# Patient Record
Sex: MALE | Race: WHITE | NOT HISPANIC OR LATINO | Employment: UNEMPLOYED | URBAN - METROPOLITAN AREA
[De-identification: names, ages, dates, MRNs, and addresses within clinical notes are randomized per-mention and may not be internally consistent; named-entity substitution may affect disease eponyms.]

---

## 2019-06-24 ENCOUNTER — OFFICE VISIT (OUTPATIENT)
Dept: FAMILY MEDICINE CLINIC | Facility: CLINIC | Age: 29
End: 2019-06-24

## 2019-06-24 VITALS
BODY MASS INDEX: 30.66 KG/M2 | SYSTOLIC BLOOD PRESSURE: 125 MMHG | TEMPERATURE: 98.1 F | DIASTOLIC BLOOD PRESSURE: 77 MMHG | HEART RATE: 70 BPM | WEIGHT: 219 LBS | HEIGHT: 71 IN | RESPIRATION RATE: 16 BRPM | OXYGEN SATURATION: 99 %

## 2019-06-24 DIAGNOSIS — R19.06 EPIGASTRIC MASS: Primary | ICD-10-CM

## 2019-06-24 PROCEDURE — 99213 OFFICE O/P EST LOW 20 MIN: CPT | Performed by: PREVENTIVE MEDICINE

## 2019-06-24 NOTE — PATIENT INSTRUCTIONS
If nausea, vomitng, diarrhea constipation.  Painful mass-to ER.  Patient to set up appointment with General surgeon.  No restrictions presently

## 2019-06-24 NOTE — PROGRESS NOTES
"Subjective   Ed Nam is a 28 y.o. male presents for   Chief Complaint   Patient presents with   • Mass     Abdominal x 2 months   No work or home injury-just noted swelling epigastric area 6 weeks ago and seems to be getting bigger. No nausea, fever, vomiting diarrhea orconstipation.    History of Present Illness     Vitals:    06/24/19 1520 06/24/19 1521   BP: 120/75 125/77   BP Location: Right arm Left arm   Patient Position: Sitting Sitting   Cuff Size: Large Adult Large Adult   Pulse: 70    Resp: 16    Temp: 98.1 °F (36.7 °C)    TempSrc: Oral    SpO2: 99%    Weight: 99.3 kg (219 lb)    Height: 180.3 cm (71\")        No current outpatient medications on file prior to visit.     No current facility-administered medications on file prior to visit.        The following portions of the patient's history were reviewed and updated as appropriate: allergies, current medications, past family history, past medical history, past social history, past surgical history and problem list.    Review of Systems   Constitutional: Negative.    HENT: Negative.  Negative for sinus pressure and sore throat.    Eyes: Negative.    Respiratory: Negative.  Negative for cough.    Cardiovascular: Negative.    Gastrointestinal: Negative.         1cm mass epigastrium that may be in muscle and not deep to muscle.  Nontender and noskin marks.  No organomegaly, BS normoactive.   No guarding or rebound   Endocrine: Negative.    Genitourinary: Negative.    Musculoskeletal: Negative.    Skin: Negative.    Allergic/Immunologic: Positive for environmental allergies.   Neurological: Negative.    Hematological: Negative.    Psychiatric/Behavioral: Negative.        Objective   Physical Exam   Constitutional: He is oriented to person, place, and time. He appears well-developed and well-nourished.   HENT:   Head: Normocephalic and atraumatic.   Eyes: Conjunctivae and EOM are normal. Pupils are equal, round, and reactive to light.   Neck: Normal " range of motion. Neck supple.   Cardiovascular: Normal rate, regular rhythm, normal heart sounds and intact distal pulses.   Pulmonary/Chest: Effort normal and breath sounds normal.   Abdominal: Soft. Bowel sounds are normal.   1 cmmass epigastric area nontender and feels like might be superficial to muscle and not reducible like hernis.  NABS and no organomegaly rebound or guarding.   Musculoskeletal: Normal range of motion.   Neurological: He is alert and oriented to person, place, and time.   Skin: Skin is warm and dry.   Psychiatric: He has a normal mood and affect.   Nursing note and vitals reviewed.    PHQ-9 Total Score: 0    Assessment/Plan   Ed was seen today for mass.    Diagnoses and all orders for this visit:    Epigastric mass        Patient Instructions   If nausea, vomitng, diarrhea constipation.  Painful mass-to ER.  Patient to set up appointment with General surgeon.  No restrictions presently

## 2019-07-17 ENCOUNTER — TELEPHONE (OUTPATIENT)
Dept: SURGERY | Facility: CLINIC | Age: 29
End: 2019-07-17

## 2019-07-17 NOTE — TELEPHONE ENCOUNTER
Referral didnt show up in our referral workqueue until today, called and spoke w pt to nery an appt, pt stated that he had an appt with Dr Landis weeks ago and he thought as a surg we would be faster.  I apoligized and explained that we only rcvd the ref today.  I asked if the pt had time and wanted to nery an appt with me now, or if he has seen another provider.  Pt states he hasnt seen another provider, but didn't have time to schedule the appt with me now, and he would call back tomorrow.  I gave the pt our info and hours.      I called Dr Padilla office and spoke with Margarita.  I asked about this ref.  Margarita states that the referral was entered incorrectly and she corrected it today.  I explained that the pt was very upset that we hadnt contacted him before now.  Margarita was going to contact the pt.

## 2019-07-22 ENCOUNTER — OFFICE VISIT (OUTPATIENT)
Dept: SURGERY | Facility: CLINIC | Age: 29
End: 2019-07-22

## 2019-07-22 VITALS
HEIGHT: 71 IN | WEIGHT: 213.8 LBS | BODY MASS INDEX: 29.93 KG/M2 | OXYGEN SATURATION: 100 % | DIASTOLIC BLOOD PRESSURE: 84 MMHG | SYSTOLIC BLOOD PRESSURE: 128 MMHG | TEMPERATURE: 98.1 F | HEART RATE: 65 BPM

## 2019-07-22 DIAGNOSIS — K43.9 EPIGASTRIC HERNIA: Primary | ICD-10-CM

## 2019-07-22 PROCEDURE — 99203 OFFICE O/P NEW LOW 30 MIN: CPT | Performed by: SURGERY

## 2019-07-22 NOTE — PROGRESS NOTES
Subjective   Ed Nam is a 28 y.o. male.     History of present illness  Ed is a pleasant 28-year-old male patient of Dr. Landis who was seen in the office today for epigastric mass.  She states that the area has been present for about 4 months.  Recent has had a lot of coughing and is gotten larger.  If he lays flat he has a difficult time feeling that when he stands and coughs something pops out and then he can push it back in.  No nausea or vomiting associated with it.  No change in his bladder or bowel habit.  No prior history of similar complaint.    Past Medical History:   Diagnosis Date   • Sinusitis        No past surgical history on file.    No outpatient encounter medications on file as of 7/22/2019.     No facility-administered encounter medications on file as of 7/22/2019.        No Known Allergies    Family History   Problem Relation Age of Onset   • Hypertension Mother    • Diabetes Father    • Cancer Father         Prostate   • Leukemia Father    • Hypertension Father        Social History     Socioeconomic History   • Marital status: Single     Spouse name: Not on file   • Number of children: Not on file   • Years of education: Not on file   • Highest education level: Not on file   Tobacco Use   • Smoking status: Former Smoker   • Smokeless tobacco: Never Used   Substance and Sexual Activity   • Alcohol use: No     Frequency: Never     Binge frequency: Never   • Drug use: No   • Sexual activity: Yes     Partners: Female     Birth control/protection: Condom, Pill       The following portions of the patient's history were reviewed and updated as appropriate: allergies, current medications, past family history, past medical history, past social history, past surgical history and problem list.    Review of Systems    Objective   Physical Exam      Assessment/Plan   Ed was seen today for mass.    Diagnoses and all orders for this visit:    Epigastric hernia      Review of systems shows  no recent change in his vision or hearing.  No change in his ability to chew eat or swallow.  He has no chest pain or shortness of breath.  No cough or wheeze.  No change in his bladder or bowel habit.  No history of anxiety or depression.  No swollen gland.  No orthopedic injury or illness.  No neuro disease.  No endocrine disease.  No blood borne illness transfusion or anemia.    Exam shows a pleasant 28-year-old male.  HEENT is negative.  Heart is regular without murmur.  Lungs are clear to auscultation bilaterally.  Abdomen is soft.  He has a small epigastric hernia.  It is reducible.  No other palpable masses.  Extremities show equal range of motion in the upper and lower extremities.  He has symmetrical strength and usage.  Neuro shows no obvious focal deficit.    Impression: Small epigastric hernia, reducible    Recommendation: We discussed hernias in general.  Hernias will not fix themselves.  We suggested that he consider a laparoscopic repair at some point.  He will call if he decides to proceed with surgery.             Manuel Hinojosa, DO  7/22/2019  3:15 PM

## 2020-02-02 ENCOUNTER — APPOINTMENT (OUTPATIENT)
Dept: CT IMAGING | Facility: HOSPITAL | Age: 30
End: 2020-02-02

## 2020-02-02 ENCOUNTER — HOSPITAL ENCOUNTER (EMERGENCY)
Facility: HOSPITAL | Age: 30
Discharge: HOME | End: 2020-02-02
Attending: EMERGENCY MEDICINE | Admitting: EMERGENCY MEDICINE

## 2020-02-02 VITALS
WEIGHT: 207 LBS | HEART RATE: 73 BPM | RESPIRATION RATE: 16 BRPM | BODY MASS INDEX: 28.04 KG/M2 | TEMPERATURE: 98 F | DIASTOLIC BLOOD PRESSURE: 79 MMHG | HEIGHT: 72 IN | SYSTOLIC BLOOD PRESSURE: 128 MMHG | OXYGEN SATURATION: 100 %

## 2020-02-02 DIAGNOSIS — N30.91 HEMORRHAGIC CYSTITIS: Primary | ICD-10-CM

## 2020-02-02 LAB
ALBUMIN SERPL-MCNC: 4.8 G/DL (ref 3.5–5.2)
ALBUMIN/GLOB SERPL: 1.5 G/DL
ALP SERPL-CCNC: 64 U/L (ref 39–117)
ALT SERPL W P-5'-P-CCNC: 32 U/L (ref 1–41)
ANION GAP SERPL CALCULATED.3IONS-SCNC: 14 MMOL/L (ref 5–15)
APTT PPP: 23.1 SECONDS (ref 24–31)
AST SERPL-CCNC: 24 U/L (ref 1–40)
BACTERIA UR QL AUTO: ABNORMAL /HPF
BASOPHILS # BLD AUTO: 0.2 10*3/MM3 (ref 0–0.2)
BASOPHILS NFR BLD AUTO: 1.5 % (ref 0–1.5)
BILIRUB SERPL-MCNC: 0.5 MG/DL (ref 0.2–1.2)
BILIRUB UR QL STRIP: NEGATIVE
BUN BLD-MCNC: 12 MG/DL (ref 6–20)
BUN/CREAT SERPL: 11.4 (ref 7–25)
C TRACH RRNA CVX QL NAA+PROBE: NOT DETECTED
CALCIUM SPEC-SCNC: 9.8 MG/DL (ref 8.6–10.5)
CHLORIDE SERPL-SCNC: 104 MMOL/L (ref 98–107)
CLARITY UR: ABNORMAL
CO2 SERPL-SCNC: 24 MMOL/L (ref 22–29)
COLOR UR: ABNORMAL
CREAT BLD-MCNC: 1.05 MG/DL (ref 0.76–1.27)
DEPRECATED RDW RBC AUTO: 41.6 FL (ref 37–54)
EOSINOPHIL # BLD AUTO: 0.3 10*3/MM3 (ref 0–0.4)
EOSINOPHIL NFR BLD AUTO: 2.6 % (ref 0.3–6.2)
ERYTHROCYTE [DISTWIDTH] IN BLOOD BY AUTOMATED COUNT: 13.6 % (ref 12.3–15.4)
GFR SERPL CREATININE-BSD FRML MDRD: 85 ML/MIN/1.73
GLOBULIN UR ELPH-MCNC: 3.1 GM/DL
GLUCOSE BLD-MCNC: 95 MG/DL (ref 65–99)
GLUCOSE UR STRIP-MCNC: NEGATIVE MG/DL
HCT VFR BLD AUTO: 43 % (ref 37.5–51)
HGB BLD-MCNC: 14.8 G/DL (ref 13–17.7)
HGB UR QL STRIP.AUTO: ABNORMAL
HYALINE CASTS UR QL AUTO: ABNORMAL /LPF
INR PPP: 1.06 (ref 0.9–1.1)
KETONES UR QL STRIP: NEGATIVE
LEUKOCYTE ESTERASE UR QL STRIP.AUTO: NEGATIVE
LYMPHOCYTES # BLD AUTO: 3.4 10*3/MM3 (ref 0.7–3.1)
LYMPHOCYTES NFR BLD AUTO: 29.2 % (ref 19.6–45.3)
MCH RBC QN AUTO: 29.9 PG (ref 26.6–33)
MCHC RBC AUTO-ENTMCNC: 34.4 G/DL (ref 31.5–35.7)
MCV RBC AUTO: 86.9 FL (ref 79–97)
MONOCYTES # BLD AUTO: 0.8 10*3/MM3 (ref 0.1–0.9)
MONOCYTES NFR BLD AUTO: 7.3 % (ref 5–12)
N GONORRHOEA RRNA SPEC QL NAA+PROBE: NOT DETECTED
NEUTROPHILS # BLD AUTO: 6.9 10*3/MM3 (ref 1.7–7)
NEUTROPHILS NFR BLD AUTO: 59.4 % (ref 42.7–76)
NITRITE UR QL STRIP: NEGATIVE
NRBC BLD AUTO-RTO: 0.1 /100 WBC (ref 0–0.2)
PH UR STRIP.AUTO: 6.5 [PH] (ref 5–8)
PLATELET # BLD AUTO: 293 10*3/MM3 (ref 140–450)
PMV BLD AUTO: 8.3 FL (ref 6–12)
POTASSIUM BLD-SCNC: 3.4 MMOL/L (ref 3.5–5.2)
PROT SERPL-MCNC: 7.9 G/DL (ref 6–8.5)
PROT UR QL STRIP: ABNORMAL
PROTHROMBIN TIME: 11 SECONDS (ref 9.6–11.7)
RBC # BLD AUTO: 4.95 10*6/MM3 (ref 4.14–5.8)
RBC # UR: ABNORMAL /HPF
REF LAB TEST METHOD: ABNORMAL
SODIUM BLD-SCNC: 142 MMOL/L (ref 136–145)
SP GR UR STRIP: 1.03 (ref 1–1.03)
SQUAMOUS #/AREA URNS HPF: ABNORMAL /HPF
UROBILINOGEN UR QL STRIP: ABNORMAL
WBC NRBC COR # BLD: 11.6 10*3/MM3 (ref 3.4–10.8)
WBC UR QL AUTO: ABNORMAL /HPF

## 2020-02-02 PROCEDURE — 85730 THROMBOPLASTIN TIME PARTIAL: CPT | Performed by: EMERGENCY MEDICINE

## 2020-02-02 PROCEDURE — 85610 PROTHROMBIN TIME: CPT | Performed by: EMERGENCY MEDICINE

## 2020-02-02 PROCEDURE — 87086 URINE CULTURE/COLONY COUNT: CPT | Performed by: EMERGENCY MEDICINE

## 2020-02-02 PROCEDURE — 87491 CHLMYD TRACH DNA AMP PROBE: CPT | Performed by: EMERGENCY MEDICINE

## 2020-02-02 PROCEDURE — 85025 COMPLETE CBC W/AUTO DIFF WBC: CPT | Performed by: EMERGENCY MEDICINE

## 2020-02-02 PROCEDURE — 74176 CT ABD & PELVIS W/O CONTRAST: CPT

## 2020-02-02 PROCEDURE — 81001 URINALYSIS AUTO W/SCOPE: CPT | Performed by: EMERGENCY MEDICINE

## 2020-02-02 PROCEDURE — 80053 COMPREHEN METABOLIC PANEL: CPT | Performed by: EMERGENCY MEDICINE

## 2020-02-02 PROCEDURE — 99283 EMERGENCY DEPT VISIT LOW MDM: CPT

## 2020-02-02 PROCEDURE — 87591 N.GONORRHOEAE DNA AMP PROB: CPT | Performed by: EMERGENCY MEDICINE

## 2020-02-02 RX ORDER — SULFAMETHOXAZOLE AND TRIMETHOPRIM 800; 160 MG/1; MG/1
1 TABLET ORAL 2 TIMES DAILY
Qty: 20 TABLET | Refills: 0 | Status: SHIPPED | OUTPATIENT
Start: 2020-02-02 | End: 2020-08-25

## 2020-02-02 NOTE — DISCHARGE INSTRUCTIONS
Return immediately for any increased bleeding, inability to empty bladder, fever, flank pain, or other concerns.

## 2020-02-02 NOTE — ED PROVIDER NOTES
Subjective   29-year-old male complains of mild dysuria with cullen hematuria with clots onset this evening.  No associated abdominal or flank pain.  No fever.  No suspicion of STD, no urethral discharge prior to this.  No bleeding in the bowels, no unexplained bruising, no nosebleeds.  No medicines.  No chronic medical issues.  Symptoms are moderate, worse with urination.          Review of Systems   Genitourinary:        As per HPI   All other systems reviewed and are negative.      Past Medical History:   Diagnosis Date   • Sinusitis        No Known Allergies    History reviewed. No pertinent surgical history.    Family History   Problem Relation Age of Onset   • Hypertension Mother    • Diabetes Father    • Cancer Father         Prostate   • Leukemia Father    • Hypertension Father        Social History     Socioeconomic History   • Marital status: Single     Spouse name: Not on file   • Number of children: Not on file   • Years of education: Not on file   • Highest education level: Not on file   Tobacco Use   • Smoking status: Current Every Day Smoker     Packs/day: 0.25     Types: Cigarettes   • Smokeless tobacco: Never Used   Substance and Sexual Activity   • Alcohol use: No     Frequency: Never     Binge frequency: Never   • Drug use: No   • Sexual activity: Yes     Partners: Female     Birth control/protection: Condom, Pill           Objective   Physical Exam   Constitutional: He is oriented to person, place, and time. He appears well-developed and well-nourished.   HENT:   Head: Normocephalic and atraumatic.   Mouth/Throat: Oropharynx is clear and moist.   Eyes: Pupils are equal, round, and reactive to light. Conjunctivae are normal.   Neck: Normal range of motion. Neck supple.   Cardiovascular: Normal rate, regular rhythm, normal heart sounds and intact distal pulses.   Pulmonary/Chest: Effort normal and breath sounds normal.   Abdominal: Soft. Bowel sounds are normal. He exhibits no distension. There is  no tenderness.   Genitourinary:   Genitourinary Comments: Uncircumcised male with cullen bloody urethral meatus, otherwise normal exam, no genital lesions or rashes or scrotal swelling or penile inflammation or injury seen.   Musculoskeletal: Normal range of motion.   Neurological: He is alert and oriented to person, place, and time.   Skin: Skin is warm and dry. Capillary refill takes less than 2 seconds.   Psychiatric: He has a normal mood and affect. His behavior is normal.       Procedures           ED Course                                               MDM  Number of Diagnoses or Management Options  Hemorrhagic cystitis:   Diagnosis management comments: Results for orders placed or performed during the hospital encounter of 02/02/20  -Comprehensive Metabolic Panel       Result                      Value             Ref Range           Glucose                     95                65 - 99 mg/dL       BUN                         12                6 - 20 mg/dL        Creatinine                  1.05              0.76 - 1.27 *       Sodium                      142               136 - 145 mm*       Potassium                   3.4 (L)           3.5 - 5.2 mm*       Chloride                    104               98 - 107 mmo*       CO2                         24.0              22.0 - 29.0 *       Calcium                     9.8               8.6 - 10.5 m*       Total Protein               7.9               6.0 - 8.5 g/*       Albumin                     4.80              3.50 - 5.20 *       ALT (SGPT)                  32                1 - 41 U/L          AST (SGOT)                  24                1 - 40 U/L          Alkaline Phosphatase        64                39 - 117 U/L        Total Bilirubin             0.5               0.2 - 1.2 mg*       eGFR Non African Amer       85                >60 mL/min/1*       Globulin                    3.1               gm/dL               A/G Ratio                   1.5                g/dL                BUN/Creatinine Ratio        11.4              7.0 - 25.0          Anion Gap                   14.0              5.0 - 15.0 m*  -Protime-INR       Result                      Value             Ref Range           Protime                     11.0              9.6 - 11.7 S*       INR                         1.06              0.90 - 1.10    -aPTT       Result                      Value             Ref Range           PTT                         23.1 (L)          24.0 - 31.0 *  -Urinalysis With Culture If Indicated - Urine, Clean Catch       Result                      Value             Ref Range           Color, UA                   Red (A)           Yellow, Straw       Appearance, UA              Cloudy (A)        Clear               pH, UA                      6.5               5.0 - 8.0           Specific [place], UA        1.027             1.005 - 1.030       Glucose, UA                 Negative          Negative            Ketones, UA                 Negative          Negative            Bilirubin, UA               Negative          Negative            Blood, UA                                     Negative        Large (3+) (A)       Protein, UA                                   Negative        >=300 mg/dL (3+) (A)       Leuk Esterase, UA           Negative          Negative            Nitrite, UA                 Negative          Negative            Urobilinogen, UA            0.2 E.U./dL       0.2 - 1.0 E.*  -CBC Auto Differential       Result                      Value             Ref Range           WBC                         11.60 (H)         3.40 - 10.80*       RBC                         4.95              4.14 - 5.80 *       Hemoglobin                  14.8              13.0 - 17.7 *       Hematocrit                  43.0              37.5 - 51.0 %       MCV                         86.9              79.0 - 97.0 *       MCH                         29.9              26.6 - 33.0 *        MCHC                        34.4              31.5 - 35.7 *       RDW                         13.6              12.3 - 15.4 %       RDW-SD                      41.6              37.0 - 54.0 *       MPV                         8.3               6.0 - 12.0 fL       Platelets                   293               140 - 450 10*       Neutrophil %                59.4              42.7 - 76.0 %       Lymphocyte %                29.2              19.6 - 45.3 %       Monocyte %                  7.3               5.0 - 12.0 %        Eosinophil %                2.6               0.3 - 6.2 %         Basophil %                  1.5               0.0 - 1.5 %         Neutrophils, Absolute       6.90              1.70 - 7.00 *       Lymphocytes, Absolute       3.40 (H)          0.70 - 3.10 *       Monocytes, Absolute         0.80              0.10 - 0.90 *       Eosinophils, Absolute       0.30              0.00 - 0.40 *       Basophils, Absolute         0.20              0.00 - 0.20 *       nRBC                        0.1               0.0 - 0.2 /1*  -Urinalysis, Microscopic Only - Urine, Clean Catch       Result                      Value             Ref Range           RBC, UA                                       None Seen /H*   Too Numerous to Count (A)       WBC, UA                     6-12 (A)          None Seen /H*       Bacteria, UA                None Seen         None Seen /H*       Squamous Epithelial Ce*     None Seen         None Seen, 0*       Hyaline Casts, UA           None Seen         None Seen /L*       Methodology                                                   Automated Microscopy  Ct Abdomen Pelvis Without Contrast    Result Date: 2/2/2020  1. Constipation. 2. Otherwise no acute abnormality. Slot 63 Electronically signed by:  Tyler Brennan M.D.  2/2/2020 12:05 AM    Patient well, able to void in emergency department.  No further bleeding.  Case discussed with urology given uncommon for 29-year-old male to  experience this.  Dr. Leiva thinks likely related to hemorrhagic cystitis, recommends p.o. Bactrim and follow-up with him on Monday.  On further discussion with the patient he now tells me he has had this in the past secondary to urinary tract infections with hematuria and flank pain.  That was at 19 years of age.  Patient denies any other instances of urinary tract infection.       Amount and/or Complexity of Data Reviewed  Clinical lab tests: reviewed  Tests in the radiology section of CPT®: reviewed        Final diagnoses:   Hemorrhagic cystitis            Manuel Paul MD  02/02/20 0306

## 2020-02-03 LAB — BACTERIA SPEC AEROBE CULT: NORMAL

## 2020-08-25 ENCOUNTER — OFFICE VISIT (OUTPATIENT)
Dept: SURGERY | Facility: CLINIC | Age: 30
End: 2020-08-25

## 2020-08-25 ENCOUNTER — PREP FOR SURGERY (OUTPATIENT)
Dept: OTHER | Facility: HOSPITAL | Age: 30
End: 2020-08-25

## 2020-08-25 VITALS
HEIGHT: 72 IN | WEIGHT: 213 LBS | BODY MASS INDEX: 28.85 KG/M2 | HEART RATE: 68 BPM | OXYGEN SATURATION: 99 % | DIASTOLIC BLOOD PRESSURE: 80 MMHG | SYSTOLIC BLOOD PRESSURE: 125 MMHG | TEMPERATURE: 98 F

## 2020-08-25 DIAGNOSIS — K43.9 EPIGASTRIC HERNIA: Primary | ICD-10-CM

## 2020-08-25 PROCEDURE — 99213 OFFICE O/P EST LOW 20 MIN: CPT | Performed by: SURGERY

## 2020-08-25 RX ORDER — SODIUM CHLORIDE 9 MG/ML
100 INJECTION, SOLUTION INTRAVENOUS CONTINUOUS
Status: CANCELLED | OUTPATIENT
Start: 2020-08-25

## 2020-08-25 NOTE — H&P
Subjective   Ed Nam is a 29 y.o. male.     History of present illness  Ed is seen in the office today to discuss his epigastric hernia again.  He is considering surgery and had some further questions.  He does lots of lifting pushing and pulling at work and is concerned about time off from work.  I explained he would be off 4 weeks minimum.    In the office today we have discussed how we actually do the surgery.  We discussed the mesh and how we hooked the mesh in.  All questions have been answered.    Past Medical History:   Diagnosis Date   • Sinusitis        No past surgical history on file.    Outpatient Encounter Medications as of 8/25/2020   Medication Sig Dispense Refill   • [DISCONTINUED] sulfamethoxazole-trimethoprim (BACTRIM DS,SEPTRA DS) 800-160 MG per tablet Take 1 tablet by mouth 2 (Two) Times a Day. 20 tablet 0     No facility-administered encounter medications on file as of 8/25/2020.        No Known Allergies    Family History   Problem Relation Age of Onset   • Hypertension Mother    • Diabetes Father    • Cancer Father         Prostate   • Leukemia Father    • Hypertension Father        Social History     Socioeconomic History   • Marital status: Single     Spouse name: Not on file   • Number of children: Not on file   • Years of education: Not on file   • Highest education level: Not on file   Tobacco Use   • Smoking status: Current Every Day Smoker     Packs/day: 0.25     Types: Cigarettes   • Smokeless tobacco: Never Used   Substance and Sexual Activity   • Alcohol use: No     Frequency: Never     Binge frequency: Never   • Drug use: No   • Sexual activity: Yes     Partners: Female     Birth control/protection: Condom, Pill       The following portions of the patient's history were reviewed and updated as appropriate: allergies, current medications, past family history, past medical history, past social history, past surgical history and problem list.    Objective        Assessment/Plan   There are no diagnoses linked to this encounter.  Complete review of systems is done and unremarkable with exception of the chief complaint    Physical exam shows a pleasant 29-year-old male.  HEENT is negative.  Heart regular.  Lungs clear.  Abdomen soft tender in the epigastrium with a reducible small epigastric hernia.  Extremities show equal range of motion in the upper and lower extremities.  He has symmetrical strength and usage.  Neuro shows no obvious focal deficit.    Impression: Reducible epigastric hernia    Plan: Laparoscopic repair with mesh             Manuel Hinojosa DO  8/25/2020  09:34

## 2020-08-25 NOTE — PROGRESS NOTES
Subjective   Ed Nam is a 29 y.o. male.     History of present illness  Ed is seen in the office today to discuss his epigastric hernia again.  He is considering surgery and had some further questions.  He does lots of lifting pushing and pulling at work and is concerned about time off from work.  I explained he would be off 4 weeks minimum.    In the office today we have discussed how we actually do the surgery.  We discussed the mesh and how we hooked the mesh in.  All questions have been answered.    Past Medical History:   Diagnosis Date   • Sinusitis        No past surgical history on file.    Outpatient Encounter Medications as of 8/25/2020   Medication Sig Dispense Refill   • [DISCONTINUED] sulfamethoxazole-trimethoprim (BACTRIM DS,SEPTRA DS) 800-160 MG per tablet Take 1 tablet by mouth 2 (Two) Times a Day. 20 tablet 0     No facility-administered encounter medications on file as of 8/25/2020.        No Known Allergies    Family History   Problem Relation Age of Onset   • Hypertension Mother    • Diabetes Father    • Cancer Father         Prostate   • Leukemia Father    • Hypertension Father        Social History     Socioeconomic History   • Marital status: Single     Spouse name: Not on file   • Number of children: Not on file   • Years of education: Not on file   • Highest education level: Not on file   Tobacco Use   • Smoking status: Current Every Day Smoker     Packs/day: 0.25     Types: Cigarettes   • Smokeless tobacco: Never Used   Substance and Sexual Activity   • Alcohol use: No     Frequency: Never     Binge frequency: Never   • Drug use: No   • Sexual activity: Yes     Partners: Female     Birth control/protection: Condom, Pill       The following portions of the patient's history were reviewed and updated as appropriate: allergies, current medications, past family history, past medical history, past social history, past surgical history and problem list.    Objective        Assessment/Plan   There are no diagnoses linked to this encounter.  Complete review of systems is done and unremarkable with exception of the chief complaint    Physical exam shows a pleasant 29-year-old male.  HEENT is negative.  Heart regular.  Lungs clear.  Abdomen soft tender in the epigastrium with a reducible small epigastric hernia.  Extremities show equal range of motion in the upper and lower extremities.  He has symmetrical strength and usage.  Neuro shows no obvious focal deficit.    Impression: Reducible epigastric hernia    Plan: Laparoscopic repair with mesh             Manuel Hinojosa DO  8/25/2020  09:32

## 2020-09-08 ENCOUNTER — LAB (OUTPATIENT)
Dept: LAB | Facility: HOSPITAL | Age: 30
End: 2020-09-08

## 2020-09-08 DIAGNOSIS — K43.9 EPIGASTRIC HERNIA: ICD-10-CM

## 2020-09-08 LAB
ALBUMIN SERPL-MCNC: 4.9 G/DL (ref 3.5–5.2)
ALBUMIN/GLOB SERPL: 1.8 G/DL
ALP SERPL-CCNC: 73 U/L (ref 39–117)
ALT SERPL W P-5'-P-CCNC: 30 U/L (ref 1–41)
ANION GAP SERPL CALCULATED.3IONS-SCNC: 11.3 MMOL/L (ref 5–15)
AST SERPL-CCNC: 22 U/L (ref 1–40)
BASOPHILS # BLD AUTO: 0.1 10*3/MM3 (ref 0–0.2)
BASOPHILS NFR BLD AUTO: 1 % (ref 0–1.5)
BILIRUB SERPL-MCNC: 0.5 MG/DL (ref 0–1.2)
BUN SERPL-MCNC: 10 MG/DL (ref 6–20)
BUN/CREAT SERPL: 8.8 (ref 7–25)
CALCIUM SPEC-SCNC: 9.4 MG/DL (ref 8.6–10.5)
CHLORIDE SERPL-SCNC: 102 MMOL/L (ref 98–107)
CO2 SERPL-SCNC: 25.7 MMOL/L (ref 22–29)
CREAT SERPL-MCNC: 1.13 MG/DL (ref 0.76–1.27)
DEPRECATED RDW RBC AUTO: 41.4 FL (ref 37–54)
EOSINOPHIL # BLD AUTO: 0.25 10*3/MM3 (ref 0–0.4)
EOSINOPHIL NFR BLD AUTO: 2.5 % (ref 0.3–6.2)
ERYTHROCYTE [DISTWIDTH] IN BLOOD BY AUTOMATED COUNT: 13.1 % (ref 12.3–15.4)
GFR SERPL CREATININE-BSD FRML MDRD: 78 ML/MIN/1.73
GLOBULIN UR ELPH-MCNC: 2.7 GM/DL
GLUCOSE SERPL-MCNC: 89 MG/DL (ref 65–99)
HCT VFR BLD AUTO: 45.7 % (ref 37.5–51)
HGB BLD-MCNC: 15.5 G/DL (ref 13–17.7)
IMM GRANULOCYTES # BLD AUTO: 0.06 10*3/MM3 (ref 0–0.05)
IMM GRANULOCYTES NFR BLD AUTO: 0.6 % (ref 0–0.5)
LYMPHOCYTES # BLD AUTO: 3.24 10*3/MM3 (ref 0.7–3.1)
LYMPHOCYTES NFR BLD AUTO: 32.2 % (ref 19.6–45.3)
MCH RBC QN AUTO: 29.6 PG (ref 26.6–33)
MCHC RBC AUTO-ENTMCNC: 33.9 G/DL (ref 31.5–35.7)
MCV RBC AUTO: 87.4 FL (ref 79–97)
MONOCYTES # BLD AUTO: 0.92 10*3/MM3 (ref 0.1–0.9)
MONOCYTES NFR BLD AUTO: 9.2 % (ref 5–12)
NEUTROPHILS NFR BLD AUTO: 5.48 10*3/MM3 (ref 1.7–7)
NEUTROPHILS NFR BLD AUTO: 54.5 % (ref 42.7–76)
NRBC BLD AUTO-RTO: 0 /100 WBC (ref 0–0.2)
PLATELET # BLD AUTO: 279 10*3/MM3 (ref 140–450)
PMV BLD AUTO: 10.7 FL (ref 6–12)
POTASSIUM SERPL-SCNC: 4.2 MMOL/L (ref 3.5–5.2)
PROT SERPL-MCNC: 7.6 G/DL (ref 6–8.5)
RBC # BLD AUTO: 5.23 10*6/MM3 (ref 4.14–5.8)
SODIUM SERPL-SCNC: 139 MMOL/L (ref 136–145)
WBC # BLD AUTO: 10.05 10*3/MM3 (ref 3.4–10.8)

## 2020-09-08 PROCEDURE — 80053 COMPREHEN METABOLIC PANEL: CPT

## 2020-09-08 PROCEDURE — 36415 COLL VENOUS BLD VENIPUNCTURE: CPT

## 2020-09-08 PROCEDURE — 85025 COMPLETE CBC W/AUTO DIFF WBC: CPT

## 2020-09-08 PROCEDURE — C9803 HOPD COVID-19 SPEC COLLECT: HCPCS

## 2020-09-08 PROCEDURE — U0004 COV-19 TEST NON-CDC HGH THRU: HCPCS

## 2020-09-09 ENCOUNTER — ANESTHESIA EVENT (OUTPATIENT)
Dept: PERIOP | Facility: HOSPITAL | Age: 30
End: 2020-09-09

## 2020-09-09 LAB — SARS-COV-2 RNA NOSE QL NAA+PROBE: NOT DETECTED

## 2020-09-10 ENCOUNTER — ANESTHESIA (OUTPATIENT)
Dept: PERIOP | Facility: HOSPITAL | Age: 30
End: 2020-09-10

## 2020-09-10 ENCOUNTER — HOSPITAL ENCOUNTER (OUTPATIENT)
Facility: HOSPITAL | Age: 30
Setting detail: HOSPITAL OUTPATIENT SURGERY
Discharge: HOME | End: 2020-09-10
Attending: SURGERY | Admitting: SURGERY

## 2020-09-10 VITALS
SYSTOLIC BLOOD PRESSURE: 131 MMHG | HEART RATE: 82 BPM | TEMPERATURE: 97 F | WEIGHT: 210.5 LBS | HEIGHT: 72 IN | RESPIRATION RATE: 14 BRPM | OXYGEN SATURATION: 95 % | DIASTOLIC BLOOD PRESSURE: 67 MMHG | BODY MASS INDEX: 28.51 KG/M2

## 2020-09-10 DIAGNOSIS — K43.9 EPIGASTRIC HERNIA: ICD-10-CM

## 2020-09-10 PROCEDURE — 25010000002 KETOROLAC TROMETHAMINE PER 15 MG: Performed by: ANESTHESIOLOGY

## 2020-09-10 PROCEDURE — 25010000003 MEPERIDINE PER 100 MG: Performed by: ANESTHESIOLOGY

## 2020-09-10 PROCEDURE — 25010000002 FENTANYL CITRATE (PF) 100 MCG/2ML SOLUTION: Performed by: ANESTHESIOLOGY

## 2020-09-10 PROCEDURE — 49652 PR LAP, VENTRAL HERNIA REPAIR,REDUCIBLE: CPT | Performed by: SURGERY

## 2020-09-10 PROCEDURE — 49652 PR LAP, VENTRAL HERNIA REPAIR,REDUCIBLE: CPT

## 2020-09-10 PROCEDURE — C1781 MESH (IMPLANTABLE): HCPCS | Performed by: SURGERY

## 2020-09-10 PROCEDURE — 25010000002 MIDAZOLAM PER 1 MG: Performed by: ANESTHESIOLOGY

## 2020-09-10 PROCEDURE — 25010000002 HYDROMORPHONE PER 4 MG: Performed by: ANESTHESIOLOGY

## 2020-09-10 PROCEDURE — 25010000002 ONDANSETRON PER 1 MG: Performed by: ANESTHESIOLOGY

## 2020-09-10 PROCEDURE — 25010000002 DEXAMETHASONE PER 1 MG: Performed by: ANESTHESIOLOGY

## 2020-09-10 DEVICE — BARD COMPOSIX L/P MESH
Type: IMPLANTABLE DEVICE | Site: ABDOMEN | Status: FUNCTIONAL
Brand: BARD COMPOSIX L/P MESH

## 2020-09-10 RX ORDER — ONDANSETRON 2 MG/ML
4 INJECTION INTRAMUSCULAR; INTRAVENOUS ONCE AS NEEDED
Status: COMPLETED | OUTPATIENT
Start: 2020-09-10 | End: 2020-09-10

## 2020-09-10 RX ORDER — PROMETHAZINE HYDROCHLORIDE 25 MG/1
25 TABLET ORAL ONCE AS NEEDED
Status: DISCONTINUED | OUTPATIENT
Start: 2020-09-10 | End: 2020-09-10 | Stop reason: HOSPADM

## 2020-09-10 RX ORDER — DEXAMETHASONE SODIUM PHOSPHATE 4 MG/ML
INJECTION, SOLUTION INTRA-ARTICULAR; INTRALESIONAL; INTRAMUSCULAR; INTRAVENOUS; SOFT TISSUE AS NEEDED
Status: DISCONTINUED | OUTPATIENT
Start: 2020-09-10 | End: 2020-09-10 | Stop reason: SURG

## 2020-09-10 RX ORDER — HYDROMORPHONE HCL 110MG/55ML
0.5 PATIENT CONTROLLED ANALGESIA SYRINGE INTRAVENOUS
Status: DISCONTINUED | OUTPATIENT
Start: 2020-09-10 | End: 2020-09-10 | Stop reason: HOSPADM

## 2020-09-10 RX ORDER — SODIUM CHLORIDE 0.9 % (FLUSH) 0.9 %
10 SYRINGE (ML) INJECTION EVERY 12 HOURS SCHEDULED
Status: DISCONTINUED | OUTPATIENT
Start: 2020-09-10 | End: 2020-09-10 | Stop reason: HOSPADM

## 2020-09-10 RX ORDER — PROMETHAZINE HYDROCHLORIDE 25 MG/1
25 SUPPOSITORY RECTAL ONCE AS NEEDED
Status: DISCONTINUED | OUTPATIENT
Start: 2020-09-10 | End: 2020-09-10 | Stop reason: HOSPADM

## 2020-09-10 RX ORDER — HYDROCODONE BITARTRATE AND ACETAMINOPHEN 7.5; 325 MG/1; MG/1
1 TABLET ORAL ONCE AS NEEDED
Status: COMPLETED | OUTPATIENT
Start: 2020-09-10 | End: 2020-09-10

## 2020-09-10 RX ORDER — SULFAMETHOXAZOLE AND TRIMETHOPRIM 800; 160 MG/1; MG/1
1 TABLET ORAL 2 TIMES DAILY
Qty: 14 TABLET | Refills: 0 | Status: SHIPPED | OUTPATIENT
Start: 2020-09-10 | End: 2020-09-29

## 2020-09-10 RX ORDER — MORPHINE SULFATE 4 MG/ML
2 INJECTION, SOLUTION INTRAMUSCULAR; INTRAVENOUS
Status: DISCONTINUED | OUTPATIENT
Start: 2020-09-10 | End: 2020-09-10 | Stop reason: HOSPADM

## 2020-09-10 RX ORDER — IBUPROFEN 400 MG/1
600 TABLET ORAL ONCE AS NEEDED
Status: DISCONTINUED | OUTPATIENT
Start: 2020-09-10 | End: 2020-09-10 | Stop reason: HOSPADM

## 2020-09-10 RX ORDER — FENTANYL CITRATE 50 UG/ML
INJECTION, SOLUTION INTRAMUSCULAR; INTRAVENOUS AS NEEDED
Status: DISCONTINUED | OUTPATIENT
Start: 2020-09-10 | End: 2020-09-10 | Stop reason: SURG

## 2020-09-10 RX ORDER — OXYCODONE AND ACETAMINOPHEN 10; 325 MG/1; MG/1
1 TABLET ORAL EVERY 4 HOURS PRN
Qty: 40 TABLET | Refills: 0 | Status: SHIPPED | OUTPATIENT
Start: 2020-09-10 | End: 2020-09-29

## 2020-09-10 RX ORDER — SODIUM CHLORIDE 9 MG/ML
100 INJECTION, SOLUTION INTRAVENOUS CONTINUOUS
Status: DISCONTINUED | OUTPATIENT
Start: 2020-09-10 | End: 2020-09-10 | Stop reason: HOSPADM

## 2020-09-10 RX ORDER — ROCURONIUM BROMIDE 10 MG/ML
INJECTION, SOLUTION INTRAVENOUS AS NEEDED
Status: DISCONTINUED | OUTPATIENT
Start: 2020-09-10 | End: 2020-09-10 | Stop reason: SURG

## 2020-09-10 RX ORDER — HYDROMORPHONE HCL 110MG/55ML
PATIENT CONTROLLED ANALGESIA SYRINGE INTRAVENOUS AS NEEDED
Status: DISCONTINUED | OUTPATIENT
Start: 2020-09-10 | End: 2020-09-10 | Stop reason: SURG

## 2020-09-10 RX ORDER — SODIUM CHLORIDE, SODIUM LACTATE, POTASSIUM CHLORIDE, CALCIUM CHLORIDE 600; 310; 30; 20 MG/100ML; MG/100ML; MG/100ML; MG/100ML
9 INJECTION, SOLUTION INTRAVENOUS CONTINUOUS PRN
Status: DISCONTINUED | OUTPATIENT
Start: 2020-09-10 | End: 2020-09-10 | Stop reason: HOSPADM

## 2020-09-10 RX ORDER — KETOROLAC TROMETHAMINE 30 MG/ML
INJECTION, SOLUTION INTRAMUSCULAR; INTRAVENOUS AS NEEDED
Status: DISCONTINUED | OUTPATIENT
Start: 2020-09-10 | End: 2020-09-10 | Stop reason: SURG

## 2020-09-10 RX ORDER — LIDOCAINE HYDROCHLORIDE 20 MG/ML
INJECTION, SOLUTION EPIDURAL; INFILTRATION; INTRACAUDAL; PERINEURAL AS NEEDED
Status: DISCONTINUED | OUTPATIENT
Start: 2020-09-10 | End: 2020-09-10 | Stop reason: SURG

## 2020-09-10 RX ORDER — BUPIVACAINE HYDROCHLORIDE AND EPINEPHRINE 5; 5 MG/ML; UG/ML
INJECTION, SOLUTION EPIDURAL; INTRACAUDAL; PERINEURAL AS NEEDED
Status: DISCONTINUED | OUTPATIENT
Start: 2020-09-10 | End: 2020-09-10 | Stop reason: HOSPADM

## 2020-09-10 RX ORDER — MIDAZOLAM HYDROCHLORIDE 1 MG/ML
INJECTION INTRAMUSCULAR; INTRAVENOUS AS NEEDED
Status: DISCONTINUED | OUTPATIENT
Start: 2020-09-10 | End: 2020-09-10 | Stop reason: SURG

## 2020-09-10 RX ORDER — MEPERIDINE HYDROCHLORIDE 25 MG/ML
12.5 INJECTION INTRAMUSCULAR; INTRAVENOUS; SUBCUTANEOUS
Status: COMPLETED | OUTPATIENT
Start: 2020-09-10 | End: 2020-09-10

## 2020-09-10 RX ORDER — SODIUM CHLORIDE 0.9 % (FLUSH) 0.9 %
10 SYRINGE (ML) INJECTION AS NEEDED
Status: DISCONTINUED | OUTPATIENT
Start: 2020-09-10 | End: 2020-09-10 | Stop reason: HOSPADM

## 2020-09-10 RX ADMIN — DEXAMETHASONE SODIUM PHOSPHATE 4 MG: 4 INJECTION, SOLUTION INTRAMUSCULAR; INTRAVENOUS at 09:59

## 2020-09-10 RX ADMIN — HYDROMORPHONE HYDROCHLORIDE 0.5 MG: 2 INJECTION, SOLUTION INTRAMUSCULAR; INTRAVENOUS; SUBCUTANEOUS at 11:45

## 2020-09-10 RX ADMIN — HYDROMORPHONE HYDROCHLORIDE 1 MG: 2 INJECTION INTRAMUSCULAR; INTRAVENOUS; SUBCUTANEOUS at 10:05

## 2020-09-10 RX ADMIN — CEFAZOLIN SODIUM 2 G: 1 INJECTION, POWDER, FOR SOLUTION INTRAMUSCULAR; INTRAVENOUS at 10:04

## 2020-09-10 RX ADMIN — SODIUM CHLORIDE, SODIUM LACTATE, POTASSIUM CHLORIDE, AND CALCIUM CHLORIDE 9 ML/HR: 600; 310; 30; 20 INJECTION, SOLUTION INTRAVENOUS at 07:46

## 2020-09-10 RX ADMIN — ROCURONIUM BROMIDE 10 MG: 10 INJECTION, SOLUTION INTRAVENOUS at 10:33

## 2020-09-10 RX ADMIN — MEPERIDINE HYDROCHLORIDE 12.5 MG: 25 INJECTION INTRAMUSCULAR; INTRAVENOUS; SUBCUTANEOUS at 11:25

## 2020-09-10 RX ADMIN — KETOROLAC TROMETHAMINE 30 MG: 30 INJECTION, SOLUTION INTRAMUSCULAR at 10:48

## 2020-09-10 RX ADMIN — LIDOCAINE HYDROCHLORIDE 80 MG: 20 INJECTION, SOLUTION EPIDURAL; INFILTRATION; INTRACAUDAL; PERINEURAL at 09:59

## 2020-09-10 RX ADMIN — SODIUM CHLORIDE, SODIUM LACTATE, POTASSIUM CHLORIDE, AND CALCIUM CHLORIDE: 600; 310; 30; 20 INJECTION, SOLUTION INTRAVENOUS at 10:49

## 2020-09-10 RX ADMIN — FENTANYL CITRATE 100 MCG: 50 INJECTION, SOLUTION INTRAMUSCULAR; INTRAVENOUS at 09:59

## 2020-09-10 RX ADMIN — MEPERIDINE HYDROCHLORIDE 12.5 MG: 25 INJECTION INTRAMUSCULAR; INTRAVENOUS; SUBCUTANEOUS at 11:30

## 2020-09-10 RX ADMIN — HYDROCODONE BITARTRATE AND ACETAMINOPHEN 1 TABLET: 7.5; 325 TABLET ORAL at 12:01

## 2020-09-10 RX ADMIN — HYDROMORPHONE HYDROCHLORIDE 0.5 MG: 2 INJECTION INTRAMUSCULAR; INTRAVENOUS; SUBCUTANEOUS at 10:38

## 2020-09-10 RX ADMIN — ROCURONIUM BROMIDE 40 MG: 10 INJECTION, SOLUTION INTRAVENOUS at 09:59

## 2020-09-10 RX ADMIN — ONDANSETRON 4 MG: 2 INJECTION INTRAMUSCULAR; INTRAVENOUS at 10:48

## 2020-09-10 RX ADMIN — MIDAZOLAM 2 MG: 1 INJECTION INTRAMUSCULAR; INTRAVENOUS at 09:59

## 2020-09-10 NOTE — OP NOTE
VENTRAL/INCISIONAL HERNIA REPAIR LAPAROSCOPIC  Procedure Report    Patient Name:  Ed Nam  YOB: 1990    Date of Surgery:  9/10/2020     Indications: Epigastric ventral hernia    Pre-op Diagnosis:   Epigastric hernia [K43.9]       Post-Op Diagnosis Codes:     * Epigastric hernia [K43.9]    Procedure/CPT® Codes:      Procedure(s):  VENTRAL/EPIGASTRIC HERNIA REPAIR LAPAROSCOPIC WITH MESH    Staff:  Surgeon(s):  Manuel Hinojosa DO    Assistant: Amber Ingram RN    Anesthesia: General    Anesthetist: Dr. Ochoa    Estimated Blood Loss: minimal    Implants:    Implant Name Type Inv. Item Serial No.  Lot No. LRB No. Used   MESH MAURY COMPOSIX LP 6.2X8.2 ELIPS - [id number] Implant MESH MAURY COMPOSIX LP 6.2X8.2 ELIPS  [employer]  (DIV OF [employer]) [id number] N/A 1       Specimen:          None        Findings: Epigastric ventral hernia    Complications: None    Description of Procedure: Ed is a 29-year-old who was seen in the office with complaint of epigastric discomfort and swelling consistent with a hernia.  It bothers him at work primarily.  We recommended that he consider a laparoscopic repair with mesh.  We discussed the procedure and risks in detail.  He understands those accepts those and wishes to proceed.    Patient was taken operating room placed in the supine position.  General was done by Dr. Ochoa.  Timeout done identity verified abdomen prepped and draped after 3-minute dry time.  A left subcostal incision was made and varies needle passed through all layers.  Saline drop test was done it was negative and the abdomen insufflated with CO2 to approximately 15 mmHg pressure.  Disposable 11 mm trocar and sheath was passed and the laparoscope was introduced confirming intra-abdominal positioning with no injury.  Two 5 mm ports were then placed in the left mid and lower abdomen under direct vision.  Falciform ligament was taken down using the cautery scissors from the  umbilicus to the upper abdomen below the sternum.  The weakness was evident once that was taken down.  We chose a 6 x 8 inch Davol dual Composix mesh.  It was placed on the skin overlying the defect and then a pattern was drawn around the edge of the mesh with 4 intersecting marks for stay sutures.  2 heavy Prolene and 2 heavy Ethibond stay sutures were placed through the mesh and the mesh was rolled in cigar fashion and introduced into the abdominal cavity.  It was unrolled and at each of the 4#areas on the skin a small stab wound was made.  Using an [employer] suture passer under direct vision one limb of each stitch was grasped retrieving it out through the skin.  Its mate was grasped retrieving it out through the skin.  This was done in all 4 locations and the stitches were tied elevating the mesh to the anterior abdominal wall.  We then used the [employer] (P)afix Staple Tacker and 90 staple tacks were placed throughout the surface area of the mesh firmly securing the mesh to the posterior fascia peritoneum.  The mesh laid nice and flat and was firmly secured.  We then placed a 0 Vicryl stitch through the fascia at the 11 mm port site using the yepptmed suture passer under direct vision.  All ports were then removed allowing CO2 to escape to the atmosphere proving no bleeding from the port sites.  Fascial stitch was tied down then skin closed with 4-0 Vicryl in a subcu manner sterile OpSite dressing applied over Mastisol and Steri-Strips.  Half percent Marcaine with epi was used for topical pain control.  He was awakened and transferred recovery in satisfactory condition.  The final sponge instrument needle counts were correct.    Assistant: Amber Ingram RN  was responsible for performing the following activities: Suturing, Closing, Placing Dressing and Held/Positioned Camera and their skilled assistance was necessary for the success of this case.    Manuel Hinojosa,      Date: 9/10/2020  Time: 10:52

## 2020-09-10 NOTE — ANESTHESIA PROCEDURE NOTES
Airway  Urgency: elective    Date/Time: 9/10/2020 10:01 AM  Airway not difficult    General Information and Staff    Patient location during procedure: OR  Anesthesiologist: Neal Ochoa MD    Indications and Patient Condition  Indications for airway management: airway protection    Preoxygenated: yes  MILS not maintained throughout  Mask difficulty assessment: 1 - vent by mask    Final Airway Details  Final airway type: endotracheal airway      Successful airway: ETT  Cuffed: yes   Successful intubation technique: direct laryngoscopy  Endotracheal tube insertion site: oral  Blade: Aisha  Blade size: 4  ETT size (mm): 7.5  Cormack-Lehane Classification: grade IIb - view of arytenoids or posterior of glottis only  Placement verified by: capnometry and palpation of cuff   Measured from: lips  ETT/EBT  to lips (cm): 21  Number of attempts at approach: 1  Assessment: lips, teeth, and gum same as pre-op and atraumatic intubation    Additional Comments  ASA monitors applied; preoxygenated with 100% FiO2 via anesthesia face mask; induction of general anesthesia; bag-mask ventilation; patient's position optimized; laryngoscopy; cuffed ETT lubricated with lidocaine jelly and placed into the trachea; cuff inflated to seal with minimally occlusive airway cuff pressure; ETT connected to anesthesia circuit; atraumatic/dentition in preoperative condition; ETT secured in place; correct placement in the trachea confirmed by bilateral chest rise, tube condensation, and return of EtCO2 > 30 mmHg x3

## 2020-09-10 NOTE — ANESTHESIA PREPROCEDURE EVALUATION
Anesthesia Evaluation     Patient summary reviewed and Nursing notes reviewed   NPO Solid Status: > 8 hours  NPO Liquid Status: > 8 hours           Airway   Mallampati: II  TM distance: >3 FB  Neck ROM: full  No difficulty expected  Dental - normal exam     Pulmonary - normal exam   (+) a smoker Current Abstained day of surgery,   Cardiovascular - normal exam        Neuro/Psych  GI/Hepatic/Renal/Endo      Musculoskeletal     Abdominal  - normal exam    Bowel sounds: normal.   Substance History      OB/GYN          Other                        Anesthesia Plan    ASA 2     general     intravenous induction     Anesthetic plan, all risks, benefits, and alternatives have been provided, discussed and informed consent has been obtained with: patient.

## 2020-09-10 NOTE — ANESTHESIA POSTPROCEDURE EVALUATION
Patient: Ed Nam    Procedure Summary     Date:  09/10/20 Room / Location:  [facility] OR 08 / [facility] MAIN OR    Anesthesia Start:  0953 Anesthesia Stop:  1113    Procedure:  VENTRAL/EPIGASTRIC HERNIA REPAIR LAPAROSCOPIC WITH MESH (N/A Abdomen) Diagnosis:       Epigastric hernia      (Epigastric hernia [K43.9])    Surgeon:  Manuel Hinojosa DO Provider:  Neal Ochoa MD    Anesthesia Type:  general ASA Status:  2          Anesthesia Type: general    Vitals  Vitals Value Taken Time   /83 9/10/2020 12:05 PM   Temp 98.4 °F (36.9 °C) 9/10/2020 12:04 PM   Pulse 69 9/10/2020 12:04 PM   Resp 13 9/10/2020 12:04 PM   SpO2 93 % 9/10/2020 12:04 PM   Vitals shown include unvalidated device data.        Post Anesthesia Care and Evaluation    Patient location during evaluation: PACU  Patient participation: complete - patient participated  Level of consciousness: awake  Pain scale: See nurse's notes for pain score.  Pain management: adequate  Airway patency: patent  Anesthetic complications: No anesthetic complications  PONV Status: none  Cardiovascular status: acceptable  Respiratory status: acceptable  Hydration status: acceptable    Comments: Patient seen and examined postoperatively; vital signs stable; SpO2 greater than or equal to 90%; cardiopulmonary status stable; nausea/vomiting adequately controlled; pain adequately controlled; no apparent anesthesia complications; patient discharged from anesthesia care when discharge criteria were met

## 2020-09-29 ENCOUNTER — OFFICE VISIT (OUTPATIENT)
Dept: SURGERY | Facility: CLINIC | Age: 30
End: 2020-09-29

## 2020-09-29 VITALS
WEIGHT: 211 LBS | SYSTOLIC BLOOD PRESSURE: 105 MMHG | DIASTOLIC BLOOD PRESSURE: 68 MMHG | TEMPERATURE: 97.8 F | HEIGHT: 72 IN | BODY MASS INDEX: 28.58 KG/M2 | HEART RATE: 57 BPM | OXYGEN SATURATION: 98 %

## 2020-09-29 DIAGNOSIS — K43.9 EPIGASTRIC HERNIA: Primary | ICD-10-CM

## 2020-09-29 PROCEDURE — 99024 POSTOP FOLLOW-UP VISIT: CPT | Performed by: SURGERY

## 2020-09-29 NOTE — PROGRESS NOTES
SUBJECTIVE:    Ed is seen in the office today follow-up from his lap ventral hernia repair 2 and half weeks ago.  He is doing very well.  No fevers or chills.  No nausea or vomiting.    OBJECTIVE:    Incisions are healing appropriately without infection    ASSESSMENT:    Satisfactory postop progress    PLAN:    He may return to light duty next Monday.  We will keep him on light duty for about 2 weeks.  I will recheck him in the office in 2 weeks and if things look good then we will release him to full activities.

## 2020-10-13 ENCOUNTER — OFFICE VISIT (OUTPATIENT)
Dept: SURGERY | Facility: CLINIC | Age: 30
End: 2020-10-13

## 2020-10-13 VITALS
BODY MASS INDEX: 28.44 KG/M2 | SYSTOLIC BLOOD PRESSURE: 123 MMHG | WEIGHT: 210 LBS | DIASTOLIC BLOOD PRESSURE: 82 MMHG | HEIGHT: 72 IN | OXYGEN SATURATION: 97 % | HEART RATE: 77 BPM | TEMPERATURE: 99.3 F

## 2020-10-13 DIAGNOSIS — K43.9 EPIGASTRIC HERNIA: Primary | ICD-10-CM

## 2020-10-13 PROCEDURE — 99024 POSTOP FOLLOW-UP VISIT: CPT | Performed by: SURGERY

## 2020-10-13 NOTE — PROGRESS NOTES
SUBJECTIVE:    Ed seen in the office today follow-up from his lap ventral hernia repair about a month ago.  He is doing well.  No fevers or chills no nausea or vomiting.  Back to light duty 2 weeks ago and is done well with that    OBJECTIVE:    Incisions are healing appropriately without infection    ASSESSMENT:    Satisfactory postop progress    PLAN:    Recheck in the office as needed

## 2020-11-23 PROCEDURE — U0003 INFECTIOUS AGENT DETECTION BY NUCLEIC ACID (DNA OR RNA); SEVERE ACUTE RESPIRATORY SYNDROME CORONAVIRUS 2 (SARS-COV-2) (CORONAVIRUS DISEASE [COVID-19]), AMPLIFIED PROBE TECHNIQUE, MAKING USE OF HIGH THROUGHPUT TECHNOLOGIES AS DESCRIBED BY CMS-2020-01-R: HCPCS | Performed by: FAMILY MEDICINE

## 2020-11-23 PROCEDURE — 87081 CULTURE SCREEN ONLY: CPT | Performed by: FAMILY MEDICINE

## 2021-05-14 PROCEDURE — U0003 INFECTIOUS AGENT DETECTION BY NUCLEIC ACID (DNA OR RNA); SEVERE ACUTE RESPIRATORY SYNDROME CORONAVIRUS 2 (SARS-COV-2) (CORONAVIRUS DISEASE [COVID-19]), AMPLIFIED PROBE TECHNIQUE, MAKING USE OF HIGH THROUGHPUT TECHNOLOGIES AS DESCRIBED BY CMS-2020-01-R: HCPCS | Performed by: FAMILY MEDICINE

## 2021-05-16 ENCOUNTER — TELEPHONE (OUTPATIENT)
Dept: URGENT CARE | Facility: CLINIC | Age: 31
End: 2021-05-16

## 2022-06-21 ENCOUNTER — HOSPITAL ENCOUNTER (EMERGENCY)
Facility: HOSPITAL | Age: 32
Discharge: HOME | End: 2022-06-21
Attending: EMERGENCY MEDICINE | Admitting: EMERGENCY MEDICINE

## 2022-06-21 ENCOUNTER — APPOINTMENT (OUTPATIENT)
Dept: ULTRASOUND IMAGING | Facility: HOSPITAL | Age: 32
End: 2022-06-21

## 2022-06-21 VITALS
HEIGHT: 72 IN | WEIGHT: 209.44 LBS | TEMPERATURE: 98.2 F | HEART RATE: 59 BPM | DIASTOLIC BLOOD PRESSURE: 81 MMHG | BODY MASS INDEX: 28.37 KG/M2 | RESPIRATION RATE: 16 BRPM | SYSTOLIC BLOOD PRESSURE: 124 MMHG | OXYGEN SATURATION: 97 %

## 2022-06-21 DIAGNOSIS — N45.1 EPIDIDYMITIS: Primary | ICD-10-CM

## 2022-06-21 LAB
ANION GAP SERPL CALCULATED.3IONS-SCNC: 14 MMOL/L (ref 5–15)
BASOPHILS # BLD AUTO: 0.1 10*3/MM3 (ref 0–0.2)
BASOPHILS NFR BLD AUTO: 0.8 % (ref 0–1.5)
BILIRUB UR QL STRIP: NEGATIVE
BUN SERPL-MCNC: 9 MG/DL (ref 6–20)
BUN/CREAT SERPL: 9.7 (ref 7–25)
CALCIUM SPEC-SCNC: 8.8 MG/DL (ref 8.6–10.5)
CHLORIDE SERPL-SCNC: 104 MMOL/L (ref 98–107)
CLARITY UR: CLEAR
CO2 SERPL-SCNC: 23 MMOL/L (ref 22–29)
COLOR UR: YELLOW
CREAT SERPL-MCNC: 0.93 MG/DL (ref 0.76–1.27)
DEPRECATED RDW RBC AUTO: 43.3 FL (ref 37–54)
EGFRCR SERPLBLD CKD-EPI 2021: 114 ML/MIN/1.73
EOSINOPHIL # BLD AUTO: 0.3 10*3/MM3 (ref 0–0.4)
EOSINOPHIL NFR BLD AUTO: 2.1 % (ref 0.3–6.2)
ERYTHROCYTE [DISTWIDTH] IN BLOOD BY AUTOMATED COUNT: 14.1 % (ref 12.3–15.4)
GLUCOSE SERPL-MCNC: 107 MG/DL (ref 65–99)
GLUCOSE UR STRIP-MCNC: NEGATIVE MG/DL
HCT VFR BLD AUTO: 44.1 % (ref 37.5–51)
HGB BLD-MCNC: 15 G/DL (ref 13–17.7)
HGB UR QL STRIP.AUTO: NEGATIVE
KETONES UR QL STRIP: ABNORMAL
LEUKOCYTE ESTERASE UR QL STRIP.AUTO: NEGATIVE
LYMPHOCYTES # BLD AUTO: 2.6 10*3/MM3 (ref 0.7–3.1)
LYMPHOCYTES NFR BLD AUTO: 21 % (ref 19.6–45.3)
MCH RBC QN AUTO: 29.6 PG (ref 26.6–33)
MCHC RBC AUTO-ENTMCNC: 33.9 G/DL (ref 31.5–35.7)
MCV RBC AUTO: 87.4 FL (ref 79–97)
MONOCYTES # BLD AUTO: 1.2 10*3/MM3 (ref 0.1–0.9)
MONOCYTES NFR BLD AUTO: 9.8 % (ref 5–12)
NEUTROPHILS NFR BLD AUTO: 66.3 % (ref 42.7–76)
NEUTROPHILS NFR BLD AUTO: 8.3 10*3/MM3 (ref 1.7–7)
NITRITE UR QL STRIP: NEGATIVE
NRBC BLD AUTO-RTO: 0 /100 WBC (ref 0–0.2)
PH UR STRIP.AUTO: 7.5 [PH] (ref 5–8)
PLATELET # BLD AUTO: 232 10*3/MM3 (ref 140–450)
PMV BLD AUTO: 8 FL (ref 6–12)
POTASSIUM SERPL-SCNC: 3.5 MMOL/L (ref 3.5–5.2)
PROT UR QL STRIP: NEGATIVE
RBC # BLD AUTO: 5.05 10*6/MM3 (ref 4.14–5.8)
SODIUM SERPL-SCNC: 141 MMOL/L (ref 136–145)
SP GR UR STRIP: 1.02 (ref 1–1.03)
UROBILINOGEN UR QL STRIP: ABNORMAL
WBC NRBC COR # BLD: 12.5 10*3/MM3 (ref 3.4–10.8)

## 2022-06-21 PROCEDURE — 99283 EMERGENCY DEPT VISIT LOW MDM: CPT

## 2022-06-21 PROCEDURE — 85025 COMPLETE CBC W/AUTO DIFF WBC: CPT | Performed by: EMERGENCY MEDICINE

## 2022-06-21 PROCEDURE — 93976 VASCULAR STUDY: CPT

## 2022-06-21 PROCEDURE — 63710000001 ONDANSETRON ODT 4 MG TABLET DISPERSIBLE: Performed by: EMERGENCY MEDICINE

## 2022-06-21 PROCEDURE — 81003 URINALYSIS AUTO W/O SCOPE: CPT | Performed by: EMERGENCY MEDICINE

## 2022-06-21 PROCEDURE — 25010000002 CEFTRIAXONE PER 250 MG: Performed by: EMERGENCY MEDICINE

## 2022-06-21 PROCEDURE — 96372 THER/PROPH/DIAG INJ SC/IM: CPT

## 2022-06-21 PROCEDURE — 36415 COLL VENOUS BLD VENIPUNCTURE: CPT

## 2022-06-21 PROCEDURE — 80048 BASIC METABOLIC PNL TOTAL CA: CPT | Performed by: EMERGENCY MEDICINE

## 2022-06-21 PROCEDURE — 76870 US EXAM SCROTUM: CPT

## 2022-06-21 RX ORDER — DOXYCYCLINE HYCLATE 100 MG/1
100 CAPSULE ORAL 2 TIMES DAILY
Qty: 14 CAPSULE | Refills: 0 | Status: SHIPPED | OUTPATIENT
Start: 2022-06-21

## 2022-06-21 RX ORDER — AMOXICILLIN AND CLAVULANATE POTASSIUM 875; 125 MG/1; MG/1
1 TABLET, FILM COATED ORAL 2 TIMES DAILY
Qty: 14 TABLET | Refills: 0 | Status: SHIPPED | OUTPATIENT
Start: 2022-06-21

## 2022-06-21 RX ORDER — ONDANSETRON 4 MG/1
4 TABLET, ORALLY DISINTEGRATING ORAL ONCE
Status: COMPLETED | OUTPATIENT
Start: 2022-06-21 | End: 2022-06-21

## 2022-06-21 RX ORDER — HYDROCODONE BITARTRATE AND ACETAMINOPHEN 5; 325 MG/1; MG/1
1 TABLET ORAL ONCE AS NEEDED
Status: COMPLETED | OUTPATIENT
Start: 2022-06-21 | End: 2022-06-21

## 2022-06-21 RX ADMIN — HYDROCODONE BITARTRATE AND ACETAMINOPHEN 1 TABLET: 5; 325 TABLET ORAL at 05:40

## 2022-06-21 RX ADMIN — ONDANSETRON 4 MG: 4 TABLET, ORALLY DISINTEGRATING ORAL at 05:39

## 2022-06-21 RX ADMIN — LIDOCAINE HYDROCHLORIDE 1 G: 10 INJECTION, SOLUTION EPIDURAL; INFILTRATION; INTRACAUDAL; PERINEURAL at 07:23

## 2022-06-21 NOTE — ED PROVIDER NOTES
Subjective   Chief complaint pain to the right testicle    History of present illness 31-year-old male who complains of pain to the back part of the right testicle that started yesterday evening and has progressively gotten worse throughout the night and this morning.  He states is throbbing in nature moderate radiates up to his lower abdomen.  Nothing makes it better nothing makes it worse he denies any fever chills sweats no nausea vomiting no dysuria frequency no penile discharge or worry of STD no black or bloody stool no bowel problems.  No injury.  Normal appetite.  No other complaints or associated symptoms he has been at home          Review of Systems   Constitutional: Negative for chills and fever.   Respiratory: Negative for chest tightness and shortness of breath.    Cardiovascular: Negative for chest pain and palpitations.   Gastrointestinal: Positive for abdominal pain. Negative for vomiting.   Genitourinary: Positive for testicular pain. Negative for difficulty urinating, dysuria, genital sores and penile discharge.   Musculoskeletal: Negative for back pain and neck pain.   Skin: Negative for color change and rash.   Psychiatric/Behavioral: Negative for agitation and behavioral problems.       Past Medical History:   Diagnosis Date   • Hernia cerebri (CMS/HCC)    • Sinusitis     once       No Known Allergies    Past Surgical History:   Procedure Laterality Date   • NO PAST SURGERIES     • VENTRAL/INCISIONAL HERNIA REPAIR N/A 9/10/2020    Procedure: VENTRAL/EPIGASTRIC HERNIA REPAIR LAPAROSCOPIC WITH MESH;  Surgeon: Manuel Hinojosa DO;  Location: [facility] MAIN OR;  Service: General;  Laterality: N/A;       Family History   Problem Relation Age of Onset   • Hypertension Mother    • Diabetes Father    • Cancer Father         Prostate   • Leukemia Father    • Hypertension Father        Social History     Socioeconomic History   • Marital status: Single   Tobacco Use   • Smoking status: Current Every Day  Smoker     Packs/day: 0.25     Types: Cigarettes   • Smokeless tobacco: Never Used   • Tobacco comment: avoid smoking dont smoke dos   Substance and Sexual Activity   • Alcohol use: No   • Drug use: No   • Sexual activity: Defer     Prior to Admission medications    Medication Sig Start Date End Date Taking? Authorizing Provider   Chlorcyclizine-Pseudoephed 25-60 MG tablet 1 tablet p.o. every 8 hours as needed nasal congestion and drainage 5/14/21   Blake Turcios MD           Objective   Physical Exam  Constitutional 31-year-old male awake alert no acute distress blood pressure 120/80 temperature 98.2 heart rate 73 nontoxic-appearing.  HEENT extraocular muscles intact pupils equal round react sclera clear.  Neck supple without adenopathy.  No meningeal signs lungs clear no retractions back no CVA tenderness heart regular without murmur rub.  Abdomen soft nontender nondistended good bowel sounds no peritoneal findings or pulsatile masses.  No pain to the inguinal canals no inguinal adenopathy appreciated.   examination testicles are downgoing normal lie normal cremasterics bilaterally testicle has no masses.  Patient has pain to the posterior aspect of testicle and epididymis area no other masses appreciated there is no crepitus or subcutaneous air there is no abscess there is no redness no signs of cellulitis there is no evidence of necrotizing fasciitis there is no redness or swelling to the perineum no penile discharge no sores or lesions are seen.  No inguinal adenopathy appreciated.  Extremities no edema cords or Homans' sign or evidence of DVT.  Skin is warm and dry without rashes or cellulitic changes.  Neurologic awake alert follows commands no focal weakness  Procedures           ED Course      Results for orders placed or performed during the hospital encounter of 06/21/22   Urinalysis With Microscopic If Indicated (No Culture) - Urine, Clean Catch    Specimen: Urine, Clean Catch   Result Value  Ref Range    Color, UA Yellow Yellow, Straw    Appearance, UA Clear Clear    pH, UA 7.5 5.0 - 8.0    Specific Gravity, UA 1.023 1.005 - 1.030    Glucose, UA Negative Negative    Ketones, UA Trace (A) Negative    Bilirubin, UA Negative Negative    Blood, UA Negative Negative    Protein, UA Negative Negative    Leuk Esterase, UA Negative Negative    Nitrite, UA Negative Negative    Urobilinogen, UA 1.0 E.U./dL 0.2 - 1.0 E.U./dL   Basic Metabolic Panel    Specimen: Blood   Result Value Ref Range    Glucose 107 (H) 65 - 99 mg/dL    BUN 9 6 - 20 mg/dL    Creatinine 0.93 0.76 - 1.27 mg/dL    Sodium 141 136 - 145 mmol/L    Potassium 3.5 3.5 - 5.2 mmol/L    Chloride 104 98 - 107 mmol/L    CO2 23.0 22.0 - 29.0 mmol/L    Calcium 8.8 8.6 - 10.5 mg/dL    BUN/Creatinine Ratio 9.7 7.0 - 25.0    Anion Gap 14.0 5.0 - 15.0 mmol/L    eGFR 114.0 >60.0 mL/min/1.73   CBC Auto Differential    Specimen: Blood   Result Value Ref Range    WBC 12.50 (H) 3.40 - 10.80 10*3/mm3    RBC 5.05 4.14 - 5.80 10*6/mm3    Hemoglobin 15.0 13.0 - 17.7 g/dL    Hematocrit 44.1 37.5 - 51.0 %    MCV 87.4 79.0 - 97.0 fL    MCH 29.6 26.6 - 33.0 pg    MCHC 33.9 31.5 - 35.7 g/dL    RDW 14.1 12.3 - 15.4 %    RDW-SD 43.3 37.0 - 54.0 fl    MPV 8.0 6.0 - 12.0 fL    Platelets 232 140 - 450 10*3/mm3    Neutrophil % 66.3 42.7 - 76.0 %    Lymphocyte % 21.0 19.6 - 45.3 %    Monocyte % 9.8 5.0 - 12.0 %    Eosinophil % 2.1 0.3 - 6.2 %    Basophil % 0.8 0.0 - 1.5 %    Neutrophils, Absolute 8.30 (H) 1.70 - 7.00 10*3/mm3    Lymphocytes, Absolute 2.60 0.70 - 3.10 10*3/mm3    Monocytes, Absolute 1.20 (H) 0.10 - 0.90 10*3/mm3    Eosinophils, Absolute 0.30 0.00 - 0.40 10*3/mm3    Basophils, Absolute 0.10 0.00 - 0.20 10*3/mm3    nRBC 0.0 0.0 - 0.2 /100 WBC     US Scrotum & Testicles    Result Date: 6/21/2022   1. Heterogeneous slightly enlarged and hypervascular epididymis on the right. Findings compatible with epididymitis. 2. Testicles appear unremarkable in appearance.   Electronically Signed By-Shaun Beth On:6/21/2022 7:02 AM This report was finalized on [id number] by  Shaun Beth, .    Medications   cefTRIAXone (ROCEPHIN) 350 mg/ml in lidocaine 1% IM syringe (1 gm vial) (has no administration in time range)   ondansetron ODT (ZOFRAN-ODT) disintegrating tablet 4 mg (4 mg Oral Given 6/21/22 1265)   HYDROcodone-acetaminophen (NORCO) 5-325 MG per tablet 1 tablet (1 tablet Oral Given 6/21/22 4035)                                                  MDM  Number of Diagnoses or Management Options  Epididymitis: new and requires workup  Diagnosis management comments: Medical decision making.  Patient had the above exam evaluation.  Patient was given hydrocodone 1 p.o. and Zofran 1 p.o. labs and urine obtained reviewed by me urine was negative CBC electrolytes unremarkable White count was 12.5.  Patient had ultrasound of testicle report reviewed by me as read by radiology there was no intratesticular masses the vascularity and the blood flow to the testicles were present.  There was no signs of a testicular torsion.  Epididymis is enlarged and heterogeneous in nature increased vascularity.  This is on the right and the left side appears to be normal no masses no evidence of torsion no hydrocele or varicocele.  Scrotal wall thickness was normal.  The patient on repeat exam is feeling much better pain medicine did help.  We talked about the findings.  Patient has no evidence of acute intra-abdominal process is exam was soft without tenderness no evidence of appendicitis no evidence of Ruben's gangrene or necrotizing fasciitis on exam.  No evidence of acute intra-abdominal process with gave a shot of Rocephin 1 g IM and he will be discharged home for outpatient follow-up and neurological follow-up.  He will be placed on Augmentin and doxycycline.  Stable otherwise unremarkable improved ER course.  We have discussed about what to return for.       Amount and/or Complexity of  Data Reviewed  Clinical lab tests: reviewed  Tests in the radiology section of CPT®: reviewed    Risk of Complications, Morbidity, and/or Mortality  Presenting problems: moderate  Diagnostic procedures: moderate  Management options: moderate    Patient Progress  Patient progress: stable      Final diagnoses:   Epididymitis       ED Disposition  ED Disposition     ED Disposition   Discharge    Condition   Stable    Comment   --             Haim Mcginnis MD  [address]  [place] [address] [phone]    In 2 days           Medication List      New Prescriptions    amoxicillin-clavulanate 875-125 MG per tablet  Commonly known as: AUGMENTIN  Take 1 tablet by mouth 2 (Two) Times a Day.     doxycycline 100 MG capsule  Commonly known as: VIBRAMYCIN  Take 1 capsule by mouth 2 (Two) Times a Day.           Where to Get Your Medications      These medications were sent to [id number] - [place], [place] - [address] AT SEC OF [address] [facility] [address] - [phone]  - [phone] FX  [address] [place] [address]    Phone: [phone]   · amoxicillin-clavulanate 875-125 MG per tablet  · doxycycline 100 MG capsule          Manuel Desai MD  06/21/22 9279

## 2022-06-21 NOTE — DISCHARGE INSTRUCTIONS
Elevate ice scrotal support  Tylenol ibuprofen for pain.  Augmentin and doxycycline sent to your pharmacy please take these antibiotics.  Return immediately for fever increasing pain redness swelling red streaks vomiting cannot hold anything down no improvement over next couple days or any other new or worsening problems or concerns.

## (undated) DEVICE — LAPAROSCOPIC GAS CONDITIONING DEVICE.: Brand: INSUFLOW

## (undated) DEVICE — SUT ETHIB 1 CT1 30IN  X425H

## (undated) DEVICE — SUT PROLN 1 CT1 30IN 8425H

## (undated) DEVICE — ENDOPATH XCEL WITH OPTIVIEW TECHNOLOGY UNIVERSAL TROCAR STABILITY SLEEVES: Brand: ENDOPATH XCEL OPTIVIEW

## (undated) DEVICE — SUT VIC 0/0 UR6 27IN DYED J603H

## (undated) DEVICE — KT SURG TURNOVER 050

## (undated) DEVICE — ENDOPATH XCEL DILATING TIP TROCARS WITH STABILITY SLEEVES: Brand: ENDOPATH XCEL

## (undated) DEVICE — CUFF SCD HEMOFORCE SEQ CALF STD MD

## (undated) DEVICE — SOL IRRIG H2O 1000ML STRL

## (undated) DEVICE — GLV SURG BIOGEL SENSR LTX PF SZ7.5

## (undated) DEVICE — UNDYED BRAIDED (POLYGLACTIN 910), SYNTHETIC ABSORBABLE SUTURE: Brand: COATED VICRYL

## (undated) DEVICE — ENDOPATH PNEUMONEEDLE INSUFFLATION NEEDLES WITH LUER LOCK CONNECTORS 120MM: Brand: ENDOPATH

## (undated) DEVICE — DRSNG SURESITE WNDW 2.38X2.75

## (undated) DEVICE — SKIN AFFIX SURG ADHESIVE 72/CS 0.55ML: Brand: MEDLINE

## (undated) DEVICE — GENERAL LAPAROSCOPY CDS: Brand: MEDLINE INDUSTRIES, INC.

## (undated) DEVICE — SOL NACL 0.9PCT 1000ML

## (undated) DEVICE — 3M™ STERI-STRIP™ REINFORCED ADHESIVE SKIN CLOSURES, R1547, 1/2 IN X 4 IN (12 MM X 100 MM), 6 STRIPS/ENVELOPE: Brand: 3M™ STERI-STRIP™